# Patient Record
Sex: FEMALE | Race: OTHER | Employment: UNEMPLOYED | URBAN - METROPOLITAN AREA
[De-identification: names, ages, dates, MRNs, and addresses within clinical notes are randomized per-mention and may not be internally consistent; named-entity substitution may affect disease eponyms.]

---

## 2019-08-17 ENCOUNTER — HOSPITAL ENCOUNTER (EMERGENCY)
Age: 19
Discharge: HOME | End: 2019-08-18
Attending: EMERGENCY MEDICINE
Payer: COMMERCIAL

## 2019-08-17 VITALS
HEIGHT: 71 IN | TEMPERATURE: 98.2 F | WEIGHT: 159.8 LBS | RESPIRATION RATE: 16 BRPM | SYSTOLIC BLOOD PRESSURE: 145 MMHG | OXYGEN SATURATION: 98 % | BODY MASS INDEX: 22.37 KG/M2 | HEART RATE: 92 BPM | DIASTOLIC BLOOD PRESSURE: 92 MMHG

## 2019-08-17 DIAGNOSIS — S01.112A EYEBROW LACERATION, LEFT, INITIAL ENCOUNTER: Primary | ICD-10-CM

## 2019-08-17 PROCEDURE — 99282 EMERGENCY DEPT VISIT SF MDM: CPT

## 2019-08-17 PROCEDURE — 4500000022 HC ED LEVEL 2 PROCEDURE

## 2019-08-17 ASSESSMENT — PAIN DESCRIPTION - ORIENTATION: ORIENTATION: LEFT

## 2019-08-17 ASSESSMENT — PAIN DESCRIPTION - PAIN TYPE: TYPE: ACUTE PAIN

## 2019-08-17 ASSESSMENT — PAIN DESCRIPTION - LOCATION: LOCATION: EYE

## 2019-08-17 ASSESSMENT — PAIN SCALES - GENERAL: PAINLEVEL_OUTOF10: 2

## 2019-08-17 ASSESSMENT — PAIN DESCRIPTION - DESCRIPTORS: DESCRIPTORS: DISCOMFORT

## 2019-08-18 NOTE — ED PROVIDER NOTES
EMERGENCY DEPARTMENT PHYSICIAN DOCUMENTATION      CHIEF COMPLAINT  LACERATION  Patient information was obtained from patient. History/Exam limitations: none. HISTORY OF PRESENT ILLNESS  Andres Li is a 20 y.o. female with complaint of LACERATION. Injured area L eyebrow  Occurred just pta  Mechanism walked into pole. Pain is sharp, worse with touching and moving, no sig radiation. Tdap is utd    REVIEW OF SYSTEMS  A full 10 point Review of Systems was performed and is negative aside from pertinent positives mentioned in HPI    ALLERGIES:  No Known Allergies    PAST HISTORY  History reviewed. No pertinent past medical history. History reviewed. No pertinent family history. No current facility-administered medications on file prior to encounter. No current outpatient medications on file prior to encounter. Social History     Tobacco Use    Smoking status: Never Smoker    Smokeless tobacco: Never Used   Substance Use Topics    Alcohol use: Not on file    Drug use: Not on file         EXAM:   Presentation Vital Signs: BP (!) 145/92   Pulse 92   Temp 98.2 °F (36.8 °C) (Oral)   Resp 16   Ht 5' 11\" (1.803 m)   Wt 72.5 kg (159 lb 12.8 oz)   SpO2 98%   BMI 22.29 kg/m²     General: Well nourished, no acute distress  Head: No traumatic injury  ENT: MMM, no facial asymmetry, no nasal discharge; see skin  Eyes: EOM  Lungs: No respiratory distress  Skin: L midline eyebrow there is a 1.5cm laceration, vertical  Extremities: Normal ROM of bilateral upper extremities at shoulders, elbows, wrists; normal ROM of bilateral LE at hips and knees. Neurologic: Alert, oriented x 3. No focal deficits upon moving arms and legs  Psychiatric: Appropriate demeanor without agitation or internal stimulation      MEDICAL DECISION MAKING  Pt with laceration of L eyebrow. Laceration appearance and characteristics are amenable to repair. No evidence of neurovascular injury of significance. Laceration repair note:   PARQ held  Laceration length: 1.5cm    Skin cleansed with chlorhex  Anesthetize with lidocaine 1% with epinephrine  Explored to wound base, no FB noted  Repaired with 5 x 5-0 prolene  Good cosmesis, hemostatic, dressing placed    Tdap utd    Also of note, no loss of consciousness, no headache, no neck pain, no numbness tingling weakness, no C-spine tenderness on exam, extraocular movements are intact on exam, no vision change    DISPOSITION  Home    IMPRESSION:  L eyebrow Laceration      This medical chart used with aid of transcription software. As such, there may be inadvertent errors in transcription of spellings and words despite physician's attempts to correct all possible errors.          Darylene Baars, MD  08/18/19 [id number]